# Patient Record
Sex: FEMALE | Race: WHITE
[De-identification: names, ages, dates, MRNs, and addresses within clinical notes are randomized per-mention and may not be internally consistent; named-entity substitution may affect disease eponyms.]

---

## 2018-12-08 ENCOUNTER — HOSPITAL ENCOUNTER (EMERGENCY)
Dept: HOSPITAL 25 - UCEAST | Age: 43
Discharge: HOME | End: 2018-12-08
Payer: COMMERCIAL

## 2018-12-08 VITALS — DIASTOLIC BLOOD PRESSURE: 91 MMHG | SYSTOLIC BLOOD PRESSURE: 139 MMHG

## 2018-12-08 DIAGNOSIS — S20.211A: ICD-10-CM
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
## 2018-12-08 DIAGNOSIS — R07.81: Primary | ICD-10-CM

## 2018-12-08 DIAGNOSIS — F17.210: ICD-10-CM

## 2018-12-08 DIAGNOSIS — R03.0: ICD-10-CM
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
## 2018-12-08 PROCEDURE — 99212 OFFICE O/P EST SF 10 MIN: CPT

## 2018-12-08 PROCEDURE — G0463 HOSPITAL OUTPT CLINIC VISIT: HCPCS

## 2018-12-08 NOTE — UC
Respiratory Complaint HPI





- HPI Summary


HPI Summary: 





44 y/o female presents to the urgent care c/o


rib brittany vs ruq pain x 2 weeks 





- History of Current Complaint


Chief Complaint: UCGeneralIllness


Stated Complaint: RIB COMPLAINT


Time Seen by Provider: 18 14:17


Hx Obtained From: Patient


Hx Last Menstrual Period: 16


Pain Intensity: 8





- Allergies/Home Medications


Allergies/Adverse Reactions: 


 Allergies











Allergy/AdvReac Type Severity Reaction Status Date / Time


 


aspirin Allergy  rash Verified 18 13:15





   swelling  


 


neomycin Allergy  Swelling Verified 18 13:15


 


ENVIRONMENTAL/SEASONAL Allergy  SNEEZING, Uncoded 18 13:15





   ITCHING  





   WATERY  





   EYES,  





   RUNNY NOSE  














PMH/Surg Hx/FS Hx/Imm Hx





- Surgical History


Surgical History: Yes


Surgery Procedure, Year, and Place:  , [facility], breast augmentation





- Family History


Known Family History: Positive: Unknown, Cardiac Disease, Hypertension





- Social History


Alcohol Use: Occasionally


Substance Use Type: None


Smoking Status (MU): Heavy Every Day Tobacco Smoker


Type: Cigarettes


Amount Used/How Often: 1 PPD


Length of Time of Smoking/Using Tobacco: 20 YEARS


Have You Smoked in the Last Year: Yes


Household Exposure Type: Cigarettes





Physical Exam


Vital Signs: 


 Initial Vital Signs











Temp  98 F   18 13:11


 


Pulse  105   18 13:11


 


Resp  20   18 13:11


 


BP  139/91   18 13:11


 


Pulse Ox  100   18 13:11














UC Diagnostic Evaluation





- Laboratory


O2 Sat by Pulse Oximetry: 100





Discharge





- Discharge Plan


Referrals: 


Deanna Patel NP [Primary Care Provider] -

## 2018-12-08 NOTE — UC
Cardiac HPI





- HPI Summary


HPI Summary: 





42 y/o female presents to the urgent care c/o RT side anterior rib pain for the 

past 2 weeks. Pt reports pain has increase w/ the days specially w/ certain 

movements, coughing or deep breathing . Pain is usually dull, but when she 

rolls over night pain is sharp and stabbing. Pt has Hx of breast implants. Pt 

denies any trauma, fever, URI, cough, SOB, difficulty breathing, chest pain, 

abdominal pain, N/V/D. She doesn't like to take medications for pain, She has 

taking only children's Motrin 15ml PO yesterday.


 





- History of Current Complaint


Chief Complaint: UCGeneralIllness


Stated Complaint: RIB COMPLAINT


Time Seen by Provider: 18 14:17


Hx Obtained From: Patient


Hx Last Menstrual Period: 16


Onset/Duration: Gradual Onset, Lasting Weeks - 2 weeks, Still Present, Worse 

Since - 2 days


Timing: Intermittent Episodes Lasting: - seconds


Initial Severity: Mild


Current Severity: Moderate


Pain Intensity: 8 - w/ movement


Chest Pain Location: Right Anterior - rib pain exacerbated w/ movement, cough 

or deep breathing


Character: Dull/Aching, Sharp/Stabbing - at times w/ certain movements


Aggravating Factor(s): Movement, Deep Breaths


Alleviating Factor(s): Rest, OTC Meds


Associated Signs & Symptoms: Negative: Chest Pain, Headaches, Numbness, Tingling

, SOB, Nausea/Vomiting, Palpitations, Cough, Back Pain, Abdominal Pain





- Risk Factors


Pulmonary Embolism Risk Factors: Negative


Cardiac Risk Factors: Negative


Atrial Fibrillation: Negative


TAD Risk Factors: Negative





- Allergy/Home Medications


Allergies/Adverse Reactions: 


 Allergies











Allergy/AdvReac Type Severity Reaction Status Date / Time


 


aspirin Allergy  rash Verified 18 13:15





   swelling  


 


neomycin Allergy  Swelling Verified 18 13:15


 


ENVIRONMENTAL/SEASONAL Allergy  SNEEZING, Uncoded 18 13:15





   ITCHING  





   WATERY  





   EYES,  





   RUNNY NOSE  














PMH/Surg Hx/FS Hx/Imm Hx


Previously Healthy: Yes


Respiratory History: Asthma





- Surgical History


Surgical History: Yes


Surgery Procedure, Year, and Place:  , [facility], breast augmentation





- Family History


Known Family History: Positive: Unknown, Cardiac Disease, Hypertension





- Social History


Occupation: Employed Full-time


Lives: With Family


Alcohol Use: Occasionally


Substance Use Type: None


Smoking Status (MU): Heavy Every Day Tobacco Smoker


Type: Cigarettes


Amount Used/How Often: 1 PPD


Length of Time of Smoking/Using Tobacco: 20 YEARS


Have You Smoked in the Last Year: Yes


Household Exposure Type: Cigarettes





Review of Systems


All Other Systems Reviewed And Are Negative: Yes


Constitutional: Positive: Negative


Skin: Positive: Other - discrete swelling on the anterior Rt side mid rib


Eyes: Positive: Negative


ENT: Positive: Negative


Respiratory: Positive: Negative


Cardiovascular: Positive: Negative


Gastrointestinal: Positive: Negative


Genitourinary: Positive: Negative


Motor: Positive: Negative


Neurovascular: Positive: Negative


Musculoskeletal: Positive: Other: - RT side anterior mid rib pain


Neurological: Positive: Negative


Psychological: Positive: Negative


Is Patient Immunocompromised?: No





Physical Exam





- Summary


Physical Exam Summary: 





Vital Signs Reviewed: Yes


General: well developed, well nourished female sitting in the examining table w/

o any apparent distress


Eyes: Positive: Conjunctiva Clear - PERRLA, EOMI, fundi grossly normal


ENT: Positive: Normal ENT inspection, Hearing grossly normal, Pharynx normal, 

Nasal congestion - edematous and erythematous nasal mucosa, Nasal drainage - 

yellowish drainage, TMs normal. Negative: Tonsillar swelling, Tonsillar exudate


Neck: Positive: Supple, Nontender, No Lymphadenopathy


Respiratory: no orthopnea or dyspnea. Able to speak in full sentences, no 

retractions or accessory muscle use, no tripod position, stridor, or head 

bobbing. Positive breath sounds bilaterally. no wheezing, and rhonchi on b/L 

lungs, no crackles or rales. Point tenderness over the mid anterior rib #9-10 w

/ mild swelling, no echymosis or bruising observed. 


Cardiovascular: Positive: RRR, No Murmur, Pulses Normal, Brisk Capillary Refill


Abdomen Description: Positive: Nontender, No Organomegaly, Soft. Negative: CVA 

Tenderness (R), CVA Tenderness (L)


Bowel Sounds: Positive: Present


Musculoskeletal Exam: Normal


Musculoskeletal: Positive: Strength Intact, ROM Intact, No Edema


Neurological Exam: Normal


Psychological Exam: Normal


Skin Exam: Normal








Triage Information Reviewed: Yes


Vital Signs: 


 Initial Vital Signs











Temp  98 F   18 13:11


 


Pulse  105   18 13:11


 


Resp  20   18 13:11


 


BP  139/91   18 13:11


 


Pulse Ox  100   18 13:11














- Assessment/Plan


Course Of Treatment: 42 y/o female presents to the urgent care c/o RT side 

anterior rib pain for the past 2 weeks. Pt reports pain has increase w/ the 

days specially w/ certain movements, coughing or deep breathing . Pain is 

usually dull, but when she rolls over night pain is sharp and stabbing. Pt has 

Hx of breast implants. Pt denies any trauma, fever, URI, cough, SOB, difficulty 

breathing, chest pain, abdominal pain, N/V/D. She doesn't like to take 

medications for pain, She has taking only children's Motrin 15ml PO yesterday. 

Hx obtained.  Pt is hemodynamically stable. RT Rib and Chest X-ray ordered to r/

o fracture. Impression:negative for fracture or pneumothorax as per 

radiologist. Pt Rx Ibuprofen PO and advised to f/u w/ her PCP or her plastic 

surgeon DR Alfaro since Hx of breast implants 2 years ago. D/C instructions 

explained. Pt's BP is elevated today advised to decrease salt in diet, monitor 

BP and f/u with PCP for further management.  Pt understood and agreed w/ plan 

of care. left clinic ambulating and hemodynamically stable.





- Differential Diagnoses - Chest Pain


Differential Diagnosis/HQI/PQRI: Chest Wall - pain,, Lower Respiratory Infection

, Pulmonary Embolism, Other: - costochondritis, rib fracture





- Clinical Impression


Provider Diagnosis: 


 Rib pain on right side, Contusion of rib on right side, Elevated BP without 

diagnosis of hypertension








Discharge





- Sign-Out/Discharge


Documenting (check all that apply): Patient Departure - D/C home


All imaging exams completed and their final reports reviewed: Yes





- Discharge Plan


Condition: Stable


Disposition: HOME


Prescriptions: 


Ibuprofen TAB* [Motrin TAB* 600 MG] 600 mg PO Q6H PRN #30 tab


 PRN Reason: Pain


Patient Education Materials:  Low-Sodium Diet (ED), Rib Contusion (ED)


Referrals: 


Deanna Patel, NP [Primary Care Provider] - 3 Days


Additional Instructions: 


1-Please take ibuprofen PO q6-8hrs prn as instructed after meals to alleviate 

pain and swelling. rest and avoid strenuous exercise or heavy lifting. Take 

deep breaths once in a while to expand lungs


2--If symptoms do not improve or worsen please f/u w/ your PCP or your Plastic 

surgeon DR Alfaro in 3 days sicne you have Hx of breast implants  for 

further evaluation and treatment.


3-Your BP is elevated today. please decrease salt in your diet, monitor BP and 

if it continues to be elevated please f/u with your PCP for further management

















- Billing Disposition and Condition


Condition: STABLE


Disposition: Home

## 2019-06-21 ENCOUNTER — HOSPITAL ENCOUNTER (EMERGENCY)
Dept: HOSPITAL 25 - UCEAST | Age: 44
Discharge: HOME | End: 2019-06-21
Payer: COMMERCIAL

## 2019-06-21 VITALS — SYSTOLIC BLOOD PRESSURE: 131 MMHG | DIASTOLIC BLOOD PRESSURE: 83 MMHG

## 2019-06-21 DIAGNOSIS — B95.4: ICD-10-CM

## 2019-06-21 DIAGNOSIS — B95.61: ICD-10-CM

## 2019-06-21 DIAGNOSIS — F32.9: ICD-10-CM

## 2019-06-21 DIAGNOSIS — F17.210: ICD-10-CM

## 2019-06-21 DIAGNOSIS — L03.115: Primary | ICD-10-CM

## 2019-06-21 PROCEDURE — 87070 CULTURE OTHR SPECIMN AEROBIC: CPT

## 2019-06-21 PROCEDURE — 99211 OFF/OP EST MAY X REQ PHY/QHP: CPT

## 2019-06-21 PROCEDURE — 87205 SMEAR GRAM STAIN: CPT

## 2019-06-21 PROCEDURE — 87641 MR-STAPH DNA AMP PROBE: CPT

## 2019-06-21 PROCEDURE — G0463 HOSPITAL OUTPT CLINIC VISIT: HCPCS

## 2019-06-21 PROCEDURE — 87640 STAPH A DNA AMP PROBE: CPT

## 2019-06-21 PROCEDURE — 87077 CULTURE AEROBIC IDENTIFY: CPT

## 2019-06-21 PROCEDURE — 87186 SC STD MICRODIL/AGAR DIL: CPT

## 2019-06-21 NOTE — UC
- Progress Note


Progress Note: 





call from lab


wound + staph


neg MRSA


pt on keflex


no change


lul 6/21/19





Course/Dx





- Diagnoses


Provider Diagnoses: 


 Cellulitis of right lower leg








Discharge





- Sign-Out/Discharge


Documenting (check all that apply): Post-Discharge Follow Up


All imaging exams completed and their final reports reviewed: No Studies





- Discharge Plan


Condition: Stable


Disposition: HOME


Prescriptions: 


Cephalexin CAP* [Keflex CAP*] 500 mg PO TID #21 cap


Mupirocin 2% OINT* [Bactroban 2 % Oint*] 1 applic TOPICAL BID #1 tube


Patient Education Materials:  Cellulitis (ED)


Referrals: 


Deanna Patel, NP [Primary Care Provider] - 3 Days


Additional Instructions: 


1-Please take full course of Antibiotic as directed.  Take yogurts w/ 

probiotics or culturelle to protect your GI system


2- If redness and swelling doubles in size after 48 hrs s of taking antibiotic 

and fever develops please go to the ER immediately. 


3-Avoid standing for long periods of time or flexing your foot, keep it 

elevated and keep wound clean and dry. Apply Bactroban topical cream as 

directed  


4-Please F/u with your PCP in 3 days  if symptoms are not improving for further 

evaluation and treatment. 








- Billing Disposition and Condition


Condition: STABLE


Disposition: Home

## 2019-06-21 NOTE — UC
Skin Complaint HPI





- HPI Summary


HPI Summary: 





42 y/o female presents to the urgent care c/o


Wound on right leg- for one week. Now swelling and red with discharge. 





- History of Current Complaint


Chief Complaint: UCLowerExtremity


Time Seen by Provider: 19 15:27


Stated Complaint: WOUND ON LEG


Hx Obtained From: Patient


Hx Last Menstrual Period: 19


Pain Intensity: 4





- Allergy/Home Medications


Allergies/Adverse Reactions: 


 Allergies











Allergy/AdvReac Type Severity Reaction Status Date / Time


 


aspirin Allergy  rash Verified 19 15:11





   swelling  


 


neomycin Allergy  Swelling Verified 19 15:11


 


ENVIRONMENTAL/SEASONAL Allergy  SNEEZING, Uncoded 18 13:15





   ITCHING  





   WATERY  





   EYES,  





   RUNNY NOSE  











Home Medications: 


 Home Medications





Sertraline* [Zoloft*] 25 mg PO DAILY 19 [History Confirmed 19]











PMH/Surg Hx/FS Hx/Imm Hx


Previously Healthy: Yes


Respiratory History: Asthma


Other GI/ History: IBS


Psychological History: Depression





- Surgical History


Surgical History: Yes


Surgery Procedure, Year, and Place:  , [facility], breast augmentation





- Family History


Known Family History: Positive: Cardiac Disease, Hypertension





- Social History


Occupation: Employed Full-time


Lives: With Family


Alcohol Use: Occasionally


Substance Use Type: None


Smoking Status (MU): Heavy Every Day Tobacco Smoker


Type: Cigarettes


Amount Used/How Often: 1 PPD


Length of Time of Smoking/Using Tobacco: 20 YEARS


Have You Smoked in the Last Year: Yes


Household Exposure Type: Cigarettes





Physical Exam


Vital Signs: 


 Initial Vital Signs











Temp  98.4 F   19 15:07


 


Pulse  82   19 15:07


 


Resp  12   19 15:07


 


BP  126/87   19 15:07


 


Pulse Ox  97   19 15:07














Course/Dx





- Differential Diagnoses - Skin Complaint


Differential Diagnoses: Abscess, Cellulitis, Contact Dermatitis, Local Allergic 

Reaction, MRSA





- Diagnoses


Provider Diagnosis: 


 Cellulitis of right lower leg








Discharge





- Sign-Out/Discharge


Documenting (check all that apply): Patient Departure - D/C home


All imaging exams completed and their final reports reviewed: No Studies





- Discharge Plan


Condition: Stable


Disposition: HOME


Prescriptions: 


Cephalexin CAP* [Keflex CAP*] 500 mg PO TID #21 cap


Mupirocin 2% OINT* [Bactroban 2 % Oint*] 1 applic TOPICAL BID #1 tube


Patient Education Materials:  Cellulitis (ED)


Referrals: 


Deanna Patel, NP [Primary Care Provider] - 3 Days


Additional Instructions: 


1-Please take full course of Antibiotic as directed.  Take yogurts w/ 

probiotics or culturelle to protect your GI system


2- If redness and swelling doubles in size after 48 hrs s of taking antibiotic 

and fever develops please go to the ER immediately. 


3-Avoid standing for long periods of time or flexing your foot, keep it 

elevated and keep wound clean and dry. Apply Bactroban topical cream as 

directed  


4-Please F/u with your PCP in 3 days  if symptoms are not improving for further 

evaluation and treatment. 








- Billing Disposition and Condition


Condition: STABLE


Disposition: Home





- Attestation Statements


Provider Attestation: 





I was available for consult. This patient was seen by the ALFREDO. The patient was 

not presented to, seen by, or examined by me. -Gianluca

## 2019-06-23 NOTE — UC
- Progress Note


Progress Note: 


Right lower leg wound culture results:


MRSA negative, staph aureus positive


Gram stain: No neutrophils observed, 2+ gram positive cocci, 1+ gram-positive 

bacilli


Preliminary culture report: Strep dysgalactiae(group C) 3+


Patient is already on Keflex and topical mupirocin


No change in plan.








Course/Dx





- Diagnoses


Provider Diagnoses: 


 Cellulitis of right lower leg








Discharge





- Sign-Out/Discharge


Documenting (check all that apply): Post-Discharge Follow Up


All imaging exams completed and their final reports reviewed: No Studies





- Discharge Plan


Condition: Stable


Disposition: HOME


Prescriptions: 


Cephalexin CAP* [Keflex CAP*] 500 mg PO TID #21 cap


Mupirocin 2% OINT* [Bactroban 2 % Oint*] 1 applic TOPICAL BID #1 tube


Patient Education Materials:  Cellulitis (ED)


Referrals: 


Deanna Patel, NP [Primary Care Provider] - 3 Days


Additional Instructions: 


1-Please take full course of Antibiotic as directed.  Take yogurts w/ 

probiotics or culturelle to protect your GI system


2- If redness and swelling doubles in size after 48 hrs s of taking antibiotic 

and fever develops please go to the ER immediately. 


3-Avoid standing for long periods of time or flexing your foot, keep it 

elevated and keep wound clean and dry. Apply Bactroban topical cream as 

directed  


4-Please F/u with your PCP in 3 days  if symptoms are not improving for further 

evaluation and treatment. 








- Billing Disposition and Condition


Condition: STABLE


Disposition: Home